# Patient Record
Sex: MALE | Race: WHITE
[De-identification: names, ages, dates, MRNs, and addresses within clinical notes are randomized per-mention and may not be internally consistent; named-entity substitution may affect disease eponyms.]

---

## 2018-10-04 ENCOUNTER — HOSPITAL ENCOUNTER (EMERGENCY)
Dept: HOSPITAL 31 - C.ER | Age: 23
Discharge: HOME | End: 2018-10-04
Payer: MEDICAID

## 2018-10-04 VITALS
TEMPERATURE: 98.6 F | HEART RATE: 88 BPM | SYSTOLIC BLOOD PRESSURE: 120 MMHG | RESPIRATION RATE: 20 BRPM | DIASTOLIC BLOOD PRESSURE: 70 MMHG

## 2018-10-04 DIAGNOSIS — J06.9: Primary | ICD-10-CM

## 2018-10-04 DIAGNOSIS — B34.9: ICD-10-CM

## 2018-10-04 LAB — INFLUENZA A B: (no result)

## 2018-10-04 NOTE — C.PDOC
History Of Present Illness





23 year old male presents to the ED for evaluation of body ache, subjective 

fever, and sore throat since yesterday. Reports little brother has similar 

symptoms. Denies nausea, vomiting, cough, abdominal pain and any other symptoms.







Time Seen by Provider: 10/04/18 19:04


Chief Complaint (Nursing): Cough, Cold, Congestion


History Per: Patient


History/Exam Limitations: no limitations


Onset/Duration Of Symptoms: Hrs


Current Symptoms Are (Timing): Still Present





Past Medical History


Reviewed: Historical Data, Nursing Documentation, Vital Signs


Vital Signs: 





                                Last Vital Signs











Temp  99 F   10/04/18 18:49


 


Pulse  98 H  10/04/18 18:49


 


Resp  18   10/04/18 18:49


 


BP  128/78   10/04/18 18:49


 


Pulse Ox  98   10/04/18 18:49














- Medical History


PMH: Asthma


Family History: States: Unknown Family Hx





- Social History


Hx Alcohol Use: No


Hx Substance Use: No





Review Of Systems


Constitutional: Positive for: Fever (subjective ), Other (myalgias).  Negative 

for: Chills


ENT: Positive for: Nose Congestion, Throat Pain (sore throat. ).  Negative for: 

Ear Pain


Respiratory: Negative for: Cough, Shortness of Breath


Gastrointestinal: Negative for: Nausea, Vomiting, Abdominal Pain


Skin: Negative for: Rash


Neurological: Negative for: Weakness, Numbness





Physical Exam





- Physical Exam


Appears: Non-toxic, No Acute Distress


Skin: Normal Color, Warm, Dry


Head: Atraumatic, Normacephalic


Eye(s): bilateral: Normal Inspection, PERRL


Ear(s): Bilateral: Normal


Nose: Normal, No Discharge


Oral Mucosa: Moist


Tongue: Normal Appearing


Lips: Normal Appearing


Throat: Erythema (mild.), No Exudate


Neck: Normal ROM, Supple


Lymphatic: No Adenopathy


Cardiovascular: Rhythm Regular, No Murmur


Respiratory: Normal Breath Sounds, No Wheezing


Gastrointestinal/Abdominal: Bowel Sounds, Soft, No Tenderness


Extremity: Normal ROM (x4)


Neurological/Psych: Oriented x3, Normal Speech, Normal Motor, Normal Sensation, 

Normal Reflexes


Gait: Steady





ED Course And Treatment


O2 Sat by Pulse Oximetry: 98 (RA)


Pulse Ox Interpretation: Normal


Progress Note: Influenza A B test done. Rapid Strep done. Given Tylenol.





Medical Decision Making


Medical Decision Making: 





will get rapid strep and flu swabs





\both neg, d/c with viral illenss





Disposition


Counseled Patient/Family Regarding: Diagnosis, Need For Followup





- Disposition


Referrals: 


[facility] [Outside]


Disposition: HOME/ ROUTINE


Disposition Time: 21:25


Condition: GOOD


Instructions:  Viral Syndrome (DC)


Forms:  [facility] (English), General Discharge Instructions





- Clinical Impression


Clinical Impression: 


 Upper respiratory infection, Viral disease, Viral syndrome








- PA / NP / Resident Statement


MD/DO has reviewed & agrees with the documentation as recorded.





- Scribe Statement


The provider has reviewed the documentation as recorded by the Scribe (Abbi Koch)

## 2018-10-10 VITALS — OXYGEN SATURATION: 98 %
